# Patient Record
Sex: MALE | Race: WHITE | Employment: FULL TIME | URBAN - METROPOLITAN AREA
[De-identification: names, ages, dates, MRNs, and addresses within clinical notes are randomized per-mention and may not be internally consistent; named-entity substitution may affect disease eponyms.]

---

## 2021-05-03 ENCOUNTER — HOSPITAL ENCOUNTER (OUTPATIENT)
Dept: PREADMISSION TESTING | Age: 36
Discharge: HOME | End: 2021-05-03
Payer: COMMERCIAL

## 2021-05-03 LAB
ANION GAP SERPL CALC-SCNC: 4 MMOL/L (ref 3–18)
BUN SERPL-MCNC: 9 MG/DL (ref 7–18)
BUN/CREAT SERPL: 13 (ref 12–20)
CALCIUM SERPL-MCNC: 8.8 MG/DL (ref 8.5–10.1)
CHLORIDE SERPL-SCNC: 107 MMOL/L (ref 100–111)
CO2 SERPL-SCNC: 28 MMOL/L (ref 21–32)
CREAT SERPL-MCNC: 0.7 MG/DL (ref 0.6–1.3)
GLUCOSE SERPL-MCNC: 91 MG/DL (ref 74–99)
HCT VFR BLD AUTO: 48.9 % (ref 36–48)
HGB BLD-MCNC: 16.2 G/DL (ref 13–16)
POTASSIUM SERPL-SCNC: 4 MMOL/L (ref 3.5–5.5)
SODIUM SERPL-SCNC: 139 MMOL/L (ref 136–145)

## 2021-05-03 PROCEDURE — 80048 BASIC METABOLIC PNL TOTAL CA: CPT

## 2021-05-03 PROCEDURE — 93005 ELECTROCARDIOGRAM TRACING: CPT

## 2021-05-03 PROCEDURE — 36415 COLL VENOUS BLD VENIPUNCTURE: CPT

## 2021-05-03 PROCEDURE — 85018 HEMOGLOBIN: CPT

## 2021-05-04 LAB
ATRIAL RATE: 65 BPM
CALCULATED P AXIS, ECG09: 0 DEGREES
CALCULATED R AXIS, ECG10: 44 DEGREES
CALCULATED T AXIS, ECG11: 49 DEGREES
DIAGNOSIS, 93000: NORMAL
P-R INTERVAL, ECG05: 150 MS
Q-T INTERVAL, ECG07: 416 MS
QRS DURATION, ECG06: 102 MS
QTC CALCULATION (BEZET), ECG08: 432 MS
VENTRICULAR RATE, ECG03: 65 BPM

## 2021-05-06 ENCOUNTER — HOSPITAL ENCOUNTER (OUTPATIENT)
Dept: PREADMISSION TESTING | Age: 36
Discharge: HOME | End: 2021-05-06
Payer: COMMERCIAL

## 2021-05-06 PROCEDURE — U0003 INFECTIOUS AGENT DETECTION BY NUCLEIC ACID (DNA OR RNA); SEVERE ACUTE RESPIRATORY SYNDROME CORONAVIRUS 2 (SARS-COV-2) (CORONAVIRUS DISEASE [COVID-19]), AMPLIFIED PROBE TECHNIQUE, MAKING USE OF HIGH THROUGHPUT TECHNOLOGIES AS DESCRIBED BY CMS-2020-01-R: HCPCS

## 2021-05-07 LAB — SARS-COV-2, COV2NT: NOT DETECTED

## 2021-05-12 ENCOUNTER — ANESTHESIA (OUTPATIENT)
Dept: SURGERY | Age: 36
End: 2021-05-12
Payer: COMMERCIAL

## 2021-05-12 ENCOUNTER — ANESTHESIA EVENT (OUTPATIENT)
Dept: SURGERY | Age: 36
End: 2021-05-12
Payer: COMMERCIAL

## 2021-05-12 ENCOUNTER — HOSPITAL ENCOUNTER (OUTPATIENT)
Age: 36
Discharge: HOME | End: 2021-05-13
Attending: OTOLARYNGOLOGY | Admitting: OTOLARYNGOLOGY
Payer: COMMERCIAL

## 2021-05-12 DIAGNOSIS — G47.33 OSA (OBSTRUCTIVE SLEEP APNEA): Primary | ICD-10-CM

## 2021-05-12 PROCEDURE — 74011250636 HC RX REV CODE- 250/636: Performed by: OTOLARYNGOLOGY

## 2021-05-12 PROCEDURE — 76060000036 HC ANESTHESIA 2.5 TO 3 HR: Performed by: OTOLARYNGOLOGY

## 2021-05-12 PROCEDURE — C1713 ANCHOR/SCREW BN/BN,TIS/BN: HCPCS | Performed by: OTOLARYNGOLOGY

## 2021-05-12 PROCEDURE — 74011250636 HC RX REV CODE- 250/636: Performed by: SPECIALIST

## 2021-05-12 PROCEDURE — 77030031139 HC SUT VCRL2 J&J -A: Performed by: OTOLARYNGOLOGY

## 2021-05-12 PROCEDURE — 74011250637 HC RX REV CODE- 250/637: Performed by: OTOLARYNGOLOGY

## 2021-05-12 PROCEDURE — 76010000132 HC OR TIME 2.5 TO 3 HR: Performed by: OTOLARYNGOLOGY

## 2021-05-12 PROCEDURE — 77030008683 HC TU ET CUF COVD -A: Performed by: SPECIALIST

## 2021-05-12 PROCEDURE — 74011250636 HC RX REV CODE- 250/636: Performed by: NURSE ANESTHETIST, CERTIFIED REGISTERED

## 2021-05-12 PROCEDURE — 77030040361 HC SLV COMPR DVT MDII -B: Performed by: OTOLARYNGOLOGY

## 2021-05-12 PROCEDURE — 2709999900 HC NON-CHARGEABLE SUPPLY: Performed by: OTOLARYNGOLOGY

## 2021-05-12 PROCEDURE — 77030020782 HC GWN BAIR PAWS FLX 3M -B: Performed by: OTOLARYNGOLOGY

## 2021-05-12 PROCEDURE — 77030020905 HC ELECTRD COAG SUC COVD -A: Performed by: OTOLARYNGOLOGY

## 2021-05-12 PROCEDURE — 77030027138 HC INCENT SPIROMETER -A

## 2021-05-12 PROCEDURE — 74011000250 HC RX REV CODE- 250: Performed by: NURSE ANESTHETIST, CERTIFIED REGISTERED

## 2021-05-12 PROCEDURE — 74011000250 HC RX REV CODE- 250: Performed by: OTOLARYNGOLOGY

## 2021-05-12 PROCEDURE — 76210000016 HC OR PH I REC 1 TO 1.5 HR: Performed by: OTOLARYNGOLOGY

## 2021-05-12 PROCEDURE — 77030020269 HC MISC IMPL: Performed by: OTOLARYNGOLOGY

## 2021-05-12 PROCEDURE — 77030006643: Performed by: SPECIALIST

## 2021-05-12 RX ORDER — HYDROCODONE BITARTRATE AND ACETAMINOPHEN 7.5; 325 MG/15ML; MG/15ML
10 SOLUTION ORAL
Status: DISCONTINUED | OUTPATIENT
Start: 2021-05-12 | End: 2021-05-13 | Stop reason: HOSPADM

## 2021-05-12 RX ORDER — PROPOFOL 10 MG/ML
INJECTION, EMULSION INTRAVENOUS AS NEEDED
Status: DISCONTINUED | OUTPATIENT
Start: 2021-05-12 | End: 2021-05-12 | Stop reason: HOSPADM

## 2021-05-12 RX ORDER — GLYCOPYRROLATE 0.2 MG/ML
INJECTION INTRAMUSCULAR; INTRAVENOUS AS NEEDED
Status: DISCONTINUED | OUTPATIENT
Start: 2021-05-12 | End: 2021-05-12 | Stop reason: HOSPADM

## 2021-05-12 RX ORDER — MIDAZOLAM HYDROCHLORIDE 1 MG/ML
INJECTION, SOLUTION INTRAMUSCULAR; INTRAVENOUS AS NEEDED
Status: DISCONTINUED | OUTPATIENT
Start: 2021-05-12 | End: 2021-05-12 | Stop reason: HOSPADM

## 2021-05-12 RX ORDER — FENTANYL CITRATE 50 UG/ML
25 INJECTION, SOLUTION INTRAMUSCULAR; INTRAVENOUS
Status: DISCONTINUED | OUTPATIENT
Start: 2021-05-12 | End: 2021-05-12 | Stop reason: HOSPADM

## 2021-05-12 RX ORDER — SODIUM CHLORIDE, SODIUM LACTATE, POTASSIUM CHLORIDE, CALCIUM CHLORIDE 600; 310; 30; 20 MG/100ML; MG/100ML; MG/100ML; MG/100ML
125 INJECTION, SOLUTION INTRAVENOUS CONTINUOUS
Status: DISCONTINUED | OUTPATIENT
Start: 2021-05-12 | End: 2021-05-13 | Stop reason: HOSPADM

## 2021-05-12 RX ORDER — NEOSTIGMINE METHYLSULFATE 1 MG/ML
INJECTION, SOLUTION INTRAVENOUS AS NEEDED
Status: DISCONTINUED | OUTPATIENT
Start: 2021-05-12 | End: 2021-05-12 | Stop reason: HOSPADM

## 2021-05-12 RX ORDER — HYDROMORPHONE HYDROCHLORIDE 2 MG/ML
INJECTION, SOLUTION INTRAMUSCULAR; INTRAVENOUS; SUBCUTANEOUS AS NEEDED
Status: DISCONTINUED | OUTPATIENT
Start: 2021-05-12 | End: 2021-05-12 | Stop reason: HOSPADM

## 2021-05-12 RX ORDER — SODIUM CHLORIDE 0.9 % (FLUSH) 0.9 %
5-40 SYRINGE (ML) INJECTION AS NEEDED
Status: DISCONTINUED | OUTPATIENT
Start: 2021-05-12 | End: 2021-05-13 | Stop reason: HOSPADM

## 2021-05-12 RX ORDER — ONDANSETRON 2 MG/ML
4 INJECTION INTRAMUSCULAR; INTRAVENOUS ONCE
Status: DISCONTINUED | OUTPATIENT
Start: 2021-05-12 | End: 2021-05-12 | Stop reason: HOSPADM

## 2021-05-12 RX ORDER — HYDROMORPHONE HYDROCHLORIDE 1 MG/ML
1 INJECTION, SOLUTION INTRAMUSCULAR; INTRAVENOUS; SUBCUTANEOUS
Status: DISCONTINUED | OUTPATIENT
Start: 2021-05-12 | End: 2021-05-13 | Stop reason: HOSPADM

## 2021-05-12 RX ORDER — ACETAMINOPHEN 500 MG
1000 TABLET ORAL
Status: COMPLETED | OUTPATIENT
Start: 2021-05-12 | End: 2021-05-12

## 2021-05-12 RX ORDER — KETAMINE HYDROCHLORIDE 10 MG/ML
INJECTION, SOLUTION INTRAMUSCULAR; INTRAVENOUS AS NEEDED
Status: DISCONTINUED | OUTPATIENT
Start: 2021-05-12 | End: 2021-05-12 | Stop reason: HOSPADM

## 2021-05-12 RX ORDER — LABETALOL HYDROCHLORIDE 5 MG/ML
10 INJECTION, SOLUTION INTRAVENOUS ONCE
Status: DISCONTINUED | OUTPATIENT
Start: 2021-05-12 | End: 2021-05-12 | Stop reason: HOSPADM

## 2021-05-12 RX ORDER — DEXAMETHASONE SODIUM PHOSPHATE 4 MG/ML
INJECTION, SOLUTION INTRA-ARTICULAR; INTRALESIONAL; INTRAMUSCULAR; INTRAVENOUS; SOFT TISSUE AS NEEDED
Status: DISCONTINUED | OUTPATIENT
Start: 2021-05-12 | End: 2021-05-12 | Stop reason: HOSPADM

## 2021-05-12 RX ORDER — DIPHENHYDRAMINE HYDROCHLORIDE 50 MG/ML
12.5 INJECTION, SOLUTION INTRAMUSCULAR; INTRAVENOUS
Status: DISCONTINUED | OUTPATIENT
Start: 2021-05-12 | End: 2021-05-13 | Stop reason: HOSPADM

## 2021-05-12 RX ORDER — HYDROMORPHONE HYDROCHLORIDE 1 MG/ML
0.5 INJECTION, SOLUTION INTRAMUSCULAR; INTRAVENOUS; SUBCUTANEOUS
Status: DISCONTINUED | OUTPATIENT
Start: 2021-05-12 | End: 2021-05-12 | Stop reason: HOSPADM

## 2021-05-12 RX ORDER — VANCOMYCIN HYDROCHLORIDE 1 G/20ML
INJECTION, POWDER, LYOPHILIZED, FOR SOLUTION INTRAVENOUS AS NEEDED
Status: DISCONTINUED | OUTPATIENT
Start: 2021-05-12 | End: 2021-05-12 | Stop reason: HOSPADM

## 2021-05-12 RX ORDER — NALOXONE HYDROCHLORIDE 0.4 MG/ML
0.1 INJECTION, SOLUTION INTRAMUSCULAR; INTRAVENOUS; SUBCUTANEOUS
Status: DISCONTINUED | OUTPATIENT
Start: 2021-05-12 | End: 2021-05-12 | Stop reason: HOSPADM

## 2021-05-12 RX ORDER — NALOXONE HYDROCHLORIDE 0.4 MG/ML
0.4 INJECTION, SOLUTION INTRAMUSCULAR; INTRAVENOUS; SUBCUTANEOUS AS NEEDED
Status: DISCONTINUED | OUTPATIENT
Start: 2021-05-12 | End: 2021-05-13 | Stop reason: HOSPADM

## 2021-05-12 RX ORDER — LIDOCAINE HYDROCHLORIDE 20 MG/ML
INJECTION, SOLUTION EPIDURAL; INFILTRATION; INTRACAUDAL; PERINEURAL AS NEEDED
Status: DISCONTINUED | OUTPATIENT
Start: 2021-05-12 | End: 2021-05-12 | Stop reason: HOSPADM

## 2021-05-12 RX ORDER — OXYCODONE HYDROCHLORIDE 5 MG/1
5 TABLET ORAL ONCE
Status: ACTIVE | OUTPATIENT
Start: 2021-05-12 | End: 2021-05-13

## 2021-05-12 RX ORDER — ONDANSETRON 2 MG/ML
INJECTION INTRAMUSCULAR; INTRAVENOUS AS NEEDED
Status: DISCONTINUED | OUTPATIENT
Start: 2021-05-12 | End: 2021-05-12 | Stop reason: HOSPADM

## 2021-05-12 RX ORDER — SODIUM CHLORIDE 0.9 % (FLUSH) 0.9 %
5-40 SYRINGE (ML) INJECTION EVERY 8 HOURS
Status: DISCONTINUED | OUTPATIENT
Start: 2021-05-12 | End: 2021-05-13 | Stop reason: HOSPADM

## 2021-05-12 RX ORDER — ROCURONIUM BROMIDE 10 MG/ML
INJECTION, SOLUTION INTRAVENOUS AS NEEDED
Status: DISCONTINUED | OUTPATIENT
Start: 2021-05-12 | End: 2021-05-12 | Stop reason: HOSPADM

## 2021-05-12 RX ORDER — SODIUM CHLORIDE, SODIUM LACTATE, POTASSIUM CHLORIDE, CALCIUM CHLORIDE 600; 310; 30; 20 MG/100ML; MG/100ML; MG/100ML; MG/100ML
50 INJECTION, SOLUTION INTRAVENOUS CONTINUOUS
Status: DISCONTINUED | OUTPATIENT
Start: 2021-05-12 | End: 2021-05-12 | Stop reason: HOSPADM

## 2021-05-12 RX ORDER — PHENYLEPHRINE HCL IN 0.9% NACL 1 MG/10 ML
SYRINGE (ML) INTRAVENOUS AS NEEDED
Status: DISCONTINUED | OUTPATIENT
Start: 2021-05-12 | End: 2021-05-12 | Stop reason: HOSPADM

## 2021-05-12 RX ORDER — FENTANYL CITRATE 50 UG/ML
INJECTION, SOLUTION INTRAMUSCULAR; INTRAVENOUS AS NEEDED
Status: DISCONTINUED | OUTPATIENT
Start: 2021-05-12 | End: 2021-05-12 | Stop reason: HOSPADM

## 2021-05-12 RX ADMIN — GLYCOPYRROLATE 0.2 MG: 0.2 INJECTION INTRAMUSCULAR; INTRAVENOUS at 15:57

## 2021-05-12 RX ADMIN — HYDROCODONE BITARTRATE AND ACETAMINOPHEN 10 MG: 7.5; 325 SOLUTION ORAL at 22:53

## 2021-05-12 RX ADMIN — FENTANYL CITRATE 25 MCG: 50 INJECTION, SOLUTION INTRAMUSCULAR; INTRAVENOUS at 20:41

## 2021-05-12 RX ADMIN — Medication 100 MCG: at 18:03

## 2021-05-12 RX ADMIN — Medication 100 MCG: at 16:49

## 2021-05-12 RX ADMIN — SODIUM CHLORIDE, SODIUM LACTATE, POTASSIUM CHLORIDE, AND CALCIUM CHLORIDE: 600; 310; 30; 20 INJECTION, SOLUTION INTRAVENOUS at 16:44

## 2021-05-12 RX ADMIN — CEFAZOLIN 1 G: 1 INJECTION, POWDER, FOR SOLUTION INTRAMUSCULAR; INTRAVENOUS at 22:53

## 2021-05-12 RX ADMIN — ONDANSETRON HYDROCHLORIDE 4 MG: 2 INJECTION INTRAMUSCULAR; INTRAVENOUS at 16:35

## 2021-05-12 RX ADMIN — Medication 100 MCG: at 17:01

## 2021-05-12 RX ADMIN — KETAMINE HYDROCHLORIDE 20 MG: 10 INJECTION, SOLUTION INTRAMUSCULAR; INTRAVENOUS at 16:24

## 2021-05-12 RX ADMIN — Medication 1.5 MG: at 18:18

## 2021-05-12 RX ADMIN — SODIUM CHLORIDE, SODIUM LACTATE, POTASSIUM CHLORIDE, AND CALCIUM CHLORIDE 125 ML/HR: 600; 310; 30; 20 INJECTION, SOLUTION INTRAVENOUS at 22:18

## 2021-05-12 RX ADMIN — FENTANYL CITRATE 25 MCG: 50 INJECTION, SOLUTION INTRAMUSCULAR; INTRAVENOUS at 21:14

## 2021-05-12 RX ADMIN — HYDROMORPHONE HYDROCHLORIDE 1 MG: 2 INJECTION, SOLUTION INTRAMUSCULAR; INTRAVENOUS; SUBCUTANEOUS at 16:44

## 2021-05-12 RX ADMIN — Medication 100 MCG: at 17:10

## 2021-05-12 RX ADMIN — FENTANYL CITRATE 25 MCG: 50 INJECTION, SOLUTION INTRAMUSCULAR; INTRAVENOUS at 21:26

## 2021-05-12 RX ADMIN — MIDAZOLAM 2 MG: 1 INJECTION INTRAMUSCULAR; INTRAVENOUS at 15:57

## 2021-05-12 RX ADMIN — HYDROMORPHONE HYDROCHLORIDE 0.5 MG: 1 INJECTION, SOLUTION INTRAMUSCULAR; INTRAVENOUS; SUBCUTANEOUS at 18:58

## 2021-05-12 RX ADMIN — LIDOCAINE HYDROCHLORIDE 100 MG: 20 INJECTION, SOLUTION EPIDURAL; INFILTRATION; INTRACAUDAL; PERINEURAL at 16:06

## 2021-05-12 RX ADMIN — PROPOFOL 300 MG: 10 INJECTION, EMULSION INTRAVENOUS at 16:06

## 2021-05-12 RX ADMIN — HYDROMORPHONE HYDROCHLORIDE 0.5 MG: 1 INJECTION, SOLUTION INTRAMUSCULAR; INTRAVENOUS; SUBCUTANEOUS at 19:16

## 2021-05-12 RX ADMIN — DEXAMETHASONE SODIUM PHOSPHATE 10 MG: 4 INJECTION, SOLUTION INTRAMUSCULAR; INTRAVENOUS at 16:27

## 2021-05-12 RX ADMIN — CEFAZOLIN 3 G: 1 INJECTION, POWDER, FOR SOLUTION INTRAVENOUS at 16:14

## 2021-05-12 RX ADMIN — GLYCOPYRROLATE 0.2 MG: 0.2 INJECTION INTRAMUSCULAR; INTRAVENOUS at 18:18

## 2021-05-12 RX ADMIN — ACETAMINOPHEN 1000 MG: 500 TABLET ORAL at 13:44

## 2021-05-12 RX ADMIN — KETAMINE HYDROCHLORIDE 10 MG: 10 INJECTION, SOLUTION INTRAMUSCULAR; INTRAVENOUS at 16:04

## 2021-05-12 RX ADMIN — FENTANYL CITRATE 100 MCG: 50 INJECTION, SOLUTION INTRAMUSCULAR; INTRAVENOUS at 15:57

## 2021-05-12 RX ADMIN — SODIUM CHLORIDE, SODIUM LACTATE, POTASSIUM CHLORIDE, AND CALCIUM CHLORIDE 125 ML/HR: 600; 310; 30; 20 INJECTION, SOLUTION INTRAVENOUS at 13:40

## 2021-05-12 RX ADMIN — KETAMINE HYDROCHLORIDE 20 MG: 10 INJECTION, SOLUTION INTRAMUSCULAR; INTRAVENOUS at 16:01

## 2021-05-12 RX ADMIN — ROCURONIUM BROMIDE 50 MG: 10 INJECTION, SOLUTION INTRAVENOUS at 16:06

## 2021-05-12 NOTE — PERIOP NOTES
Reviewed PTA medication list with patient/caregiver and patient/caregiver denies any additional medications. Patient admits to having a responsible adult care for them at home for at least 24 hours after surgery. Patient encouraged to use gown warming system and informed that using said warming gown to regulate body temperature prior to a procedure has been shown to help reduce the risks of blood clots and infection. Patient's pharmacy of choice verified and documented in PTA medication section. Dual skin assessment & fall risk band verification completed with Andreea Oates RN.

## 2021-05-12 NOTE — PROGRESS NOTES
Operative Report    Indications: This is a 43 yrs male who presents with BANDAR . He was positive for tonsillar hypertrophy . The patient was admitted for surgery as conservative measures have failed. Date of Procedure: 5/12/2021    Procedure Name: Procedure(s):  UVULOPALATOPHARYNGOPLASTY (UPPP),  TONSILLECTOMY,HYOID SUSPENSION    Preoperative Diagnosis: UVULAR HYPERTROPHY,TONSILLOR HYPERTROPHY,OBSTRUCTIVE SLEEP APNEA    Postoperative Diagnosis:  UVULAR HYPERTROPHY,TONSILLOR HYPERTROPHY,OBSTRUCTIVE SLEEP APNEA    Surgeon(s):  Luli Barreto DO    Anesthesia:  General    Findings:  + 3 tonsils    Estimated Blood Loss:  50ml    Specimens: none    Asst; none    Implants; siesta hyoid suspension system                Complications:  None; patient tolerated the procedure well.     Signed By: Musa Bocanegra DO     May 12, 2021

## 2021-05-12 NOTE — INTERVAL H&P NOTE
Update History & Physical    The Patient's History and Physical of May 3,   2021 was reviewed with the patient and I examined the patient. There was no change. The surgical site was confirmed by the patient and me. Plan:  The risk, benefits, expected outcome, and alternative to the recommended procedure have been discussed with the patient. Patient understands and wants to proceed with the procedure.     Electronically signed by Lisa Roach DO on 5/12/2021 at 3:34 PM

## 2021-05-12 NOTE — ANESTHESIA PREPROCEDURE EVALUATION
Relevant Problems   No relevant active problems       Anesthetic History   No history of anesthetic complications            Review of Systems / Medical History  Patient summary reviewed, nursing notes reviewed and pertinent labs reviewed    Pulmonary        Sleep apnea           Neuro/Psych   Within defined limits           Cardiovascular    Hypertension              Exercise tolerance: >4 METS     GI/Hepatic/Renal  Within defined limits              Endo/Other        Obesity     Other Findings              Physical Exam    Airway  Mallampati: III  TM Distance: 4 - 6 cm  Neck ROM: normal range of motion   Mouth opening: Normal     Cardiovascular               Dental    Dentition: Caps/crowns     Pulmonary                 Abdominal         Other Findings            Anesthetic Plan    ASA: 3  Anesthesia type: general          Induction: Intravenous  Anesthetic plan and risks discussed with: Patient

## 2021-05-13 VITALS
TEMPERATURE: 97.7 F | SYSTOLIC BLOOD PRESSURE: 130 MMHG | DIASTOLIC BLOOD PRESSURE: 81 MMHG | RESPIRATION RATE: 17 BRPM | OXYGEN SATURATION: 98 % | HEIGHT: 73 IN | WEIGHT: 297.06 LBS | HEART RATE: 71 BPM | BODY MASS INDEX: 39.37 KG/M2

## 2021-05-13 PROCEDURE — 74011250637 HC RX REV CODE- 250/637: Performed by: OTOLARYNGOLOGY

## 2021-05-13 PROCEDURE — 74011250636 HC RX REV CODE- 250/636: Performed by: OTOLARYNGOLOGY

## 2021-05-13 PROCEDURE — 74011000250 HC RX REV CODE- 250: Performed by: OTOLARYNGOLOGY

## 2021-05-13 RX ADMIN — HYDROCODONE BITARTRATE AND ACETAMINOPHEN 10 MG: 7.5; 325 SOLUTION ORAL at 09:02

## 2021-05-13 RX ADMIN — HYDROCODONE BITARTRATE AND ACETAMINOPHEN 10 MG: 7.5; 325 SOLUTION ORAL at 02:53

## 2021-05-13 RX ADMIN — HYDROMORPHONE HYDROCHLORIDE 1 MG: 1 INJECTION, SOLUTION INTRAMUSCULAR; INTRAVENOUS; SUBCUTANEOUS at 04:20

## 2021-05-13 RX ADMIN — HYDROMORPHONE HYDROCHLORIDE 1 MG: 1 INJECTION, SOLUTION INTRAMUSCULAR; INTRAVENOUS; SUBCUTANEOUS at 00:29

## 2021-05-13 RX ADMIN — CEFAZOLIN 1 G: 1 INJECTION, POWDER, FOR SOLUTION INTRAMUSCULAR; INTRAVENOUS at 04:19

## 2021-05-13 NOTE — PERIOP NOTES
TRANSFER - OUT REPORT:    Verbal report given to Kraen Rosas RN (name) on Tyler Lepe  being transferred to 2 S (unit) for routine progression of care       Report consisted of patients Situation, Background, Assessment and   Recommendations(SBAR). Information from the following report(s) OR Summary, Procedure Summary, Intake/Output and MAR was reviewed with the receiving nurse. Lines:   Peripheral IV 05/12/21 Left;Posterior Hand (Active)   Site Assessment Clean, dry, & intact 05/12/21 1944   Phlebitis Assessment 0 05/12/21 1944   Infiltration Assessment 0 05/12/21 1944   Dressing Status Clean, dry, & intact 05/12/21 1944   Dressing Type Transparent 05/12/21 1944   Hub Color/Line Status Infusing 05/12/21 1944   Alcohol Cap Used No 05/12/21 1342        Opportunity for questions and clarification was provided.       Patient transported with:   O2 @ 2 liters  Registered Nurse

## 2021-05-13 NOTE — PROGRESS NOTES
0902 - Pain 6/10. PRN 10 mg hycet oral solution pain medication administered at this time. Patient has been educated on side effects. 3946 - Patient in bed at this time. A/O x 4. IV to left hand  intact and patent. Plexis to BLE. White wrap dressing to face/throat CDI. Patient denies numbness/tingling. Pedal pulses palpable. Lungs clear. Bowel sounds active to all quadrants. Patient able to get to 1400 on the incentive spirometer. Pain 6/10.     1008-Discharge instructions reviewed with patient at this time. Opportunity for questions and clarification was provided. Patient has verbalized understanding. Patient was provided with care notes to include side effects of RX's. Arm bands removed and shredded. AVS reviewed with [facility] Rn.      IV removed. Dressing CDI. 1020-Called pharmacy to confirm we can fill patient's prescription for liquid hydrocodone as the patient's [facility] can only fill it next Wednesday. Called Dr. Chris Jones and gave him [facility] pharmacy information so he can e prescribe the medication.

## 2021-05-13 NOTE — ROUTINE PROCESS
TRANSFER - IN REPORT:    Verbal report received from CARMEL Camejo(name) on Yonis Motta  being received from PACU for routine post - op. Report consisted of patients Situation, Background, Assessment and   Recommendations(SBAR). Information from the following report(s) SBAR, Kardex, OR Summary, Intake/Output and MAR was reviewed with the receiving nurse. Opportunity for questions and clarification was provided. Assessment to be completed upon patients arrival to unit and care assumed.

## 2021-05-13 NOTE — ROUTINE PROCESS
Bedside and Verbal shift change report given to YANICK Salazar RN by Kai Hernandez RN. Report included the following information SBAR, Kardex, Intake/Output and MAR.

## 2021-05-13 NOTE — PERIOP NOTES
SBAR given to [employer] denies difficulty breathing.  No swelling, bleeding, or redness at the site and AOX4

## 2021-05-13 NOTE — PROGRESS NOTES
Problem: Falls - Risk of  Goal: *Absence of Falls  Description: Document Lear Shaker Fall Risk and appropriate interventions in the flowsheet.   Outcome: Progressing Towards Goal  Note: Fall Risk Interventions:            Medication Interventions: Teach patient to arise slowly

## 2021-05-13 NOTE — PROGRESS NOTES
2205 - Patient arrives to unit at this time. Admission completed at this time. Patient is A/O x 4, 2L NC. Pt placed on capnography. Denies chest pain and SOB. Plexi compression device bilaterally. Gauze/wrap dressing to chin/head C/D/I. Denies numbness/tingling/calf pain. Pain 8/10 with a tolerable level of 5/10 pain medication administered per MAR. Pt educated on IS use, q2h rounds, pain management, CHG wipes and \"Up for Meals\". Pt verbalized understanding, no concerns voiced. Call bell within reach, bed in lowest position. Pt encouraged to call for assistance. FALL BAND IN PLACE.    0029 - Patient rated pain 8/10, pain medication administered per MAR. No other concerns voiced at this time. Call bell within reach, bed in lowest position. Pt encouraged to use call bell for any needs. 7980 - Patient rated pain 8/10, pain medication administered per MAR. No other concerns voiced at this time. Call bell within reach, bed in lowest position. Pt encouraged to use call bell for any needs. 6093 - Patient rated pain 7/10, pain medication administered per MAR. No other concerns voiced at this time. Call bell within reach, bed in lowest position. Pt encouraged to use call bell for any needs.

## 2021-05-13 NOTE — PROGRESS NOTES
05/12/21 2225   Patient Observations   Pulse (Heart Rate) 77   Resp Rate 14   O2 Sat (%) 98 %   ETCO2 (mmHg) 50 mmHg

## 2021-05-13 NOTE — ANESTHESIA POSTPROCEDURE EVALUATION
Post-Anesthesia Evaluation and Assessment    Cardiovascular Function/Vital Signs  Visit Vitals  /85   Pulse 72   Temp 36.2 °C (97.2 °F)   Resp 13   Ht 6' 1\" (1.854 m)   Wt 134.7 kg (297 lb 1 oz)   SpO2 97%   BMI 39.19 kg/m²       Patient is status post Procedure(s):  UVULOPALATOPHARYNGOPLASTY (UPPP),  TONSILLECTOMY,HYOID SUSPENSION. Nausea/Vomiting: Controlled. Postoperative hydration reviewed and adequate. Pain:  Pain Scale 1: FLACC (05/12/21 1941)  Pain Intensity 1: 0 (05/12/21 1941)   Managed. Neurological Status:   Neuro (WDL): Within Defined Limits (05/12/21 1941)   At baseline. Mental Status and Level of Consciousness: Baseline and appropriate for discharge. Pulmonary Status:   O2 Device: Nasal cannula (05/12/21 1950)   Adequate oxygenation and airway patent. Complications related to anesthesia: None    Post-anesthesia assessment completed. No concerns. Patient has met all discharge requirements.     Signed By: Everette Garrison MD    May 12, 2021

## 2021-05-13 NOTE — PERIOP NOTES
Patient received by Alfredo Caballero, patient stable, Blood pressure (!) 146/92, pulse 69, temperature 98.8 °F (37.1 °C), resp. rate 16, height 6' 1\" (1.854 m), weight 134.7 kg (297 lb 1 oz), SpO2 98 %.

## 2021-05-13 NOTE — PROGRESS NOTES
[facility] ENT ~ Allergy Daily Progress Note     Assessment:     1. POD1 Multilevel airway surgery, UP3, Hyoid   2. BANDAR    Plan:     1. Doing well, d/c home today 5/13  2. May shower  3. Maintain dressing x 3 days   4. Diet as tolerated   5. Pain meds as rx'ed   6. Antibiotics as rx'ed   7. F/u 2 week     Subjective:     Patient's condition is good. Resting comfortably in bed . Eating . No airway issues     Objective:     Visit Vitals  /81   Pulse 71   Temp 97.7 °F (36.5 °C)   Resp 17   Ht 6' 1\" (1.854 m)   Wt 134.7 kg (297 lb 1 oz)   SpO2 98%   BMI 39.19 kg/m²       Intake and Output:  Current Shift:  No intake/output data recorded. Last three shifts:  05/11 1901 - 05/13 0700  In: 1000 [I.V.:1000]  Out: 2500 [Urine:2500]    Lab/Data Reviewed:  No results found for this or any previous visit (from the past 24 hour(s)).     Current Facility-Administered Medications   Medication Dose Route Frequency Provider Last Rate Last Admin    lactated Ringers infusion  125 mL/hr IntraVENous CONTINUOUS Ranelle Lathe,  mL/hr at 05/12/21 2218 125 mL/hr at 05/12/21 2218    sodium chloride (NS) flush 5-40 mL  5-40 mL IntraVENous Q8H Ranelle Lathe, DO   Stopped at 05/12/21 2300    sodium chloride (NS) flush 5-40 mL  5-40 mL IntraVENous PRN Ranelle Lathe, DO        acetaminophen (TYLENOL) solution 650 mg  650 mg Oral Q4H PRN Ranelle Lathe, DO        HYDROcodone-acetaminophen (HYCET) 0.5-21.7 mg/mL oral solution 10 mg  10 mg Oral Q4H PRN Ranelle Lathe, DO   10 mg at 05/13/21 0253    HYDROmorphone (PF) (DILAUDID) injection 1 mg  1 mg IntraVENous Q4H PRN Ranelle Lathe, DO   1 mg at 05/13/21 0420    naloxone [facility]) injection 0.4 mg  0.4 mg IntraVENous PRN Ranelle Lathe, DO        promethazine [facility]) with saline injection 12.5 mg  12.5 mg IntraVENous Q6H PRN Ranelle Lathe, DO        diphenhydrAMINE (BENADRYL) injection 12.5 mg  12.5 mg IntraVENous Q4H PRN Ranelle Lathe, DO           General: vss, afeb  HEENT: wounds c/d/i     FWL

## 2021-05-13 NOTE — PROGRESS NOTES
Transition of Care (JAYDA) Plan:          Pt admitted for an elective surgical procedure. Pt is independent. Please encourage ambulation. No transition of care needs identified at this time. Anticipate pt will be medically stable for discharge within the next 24-48 hours with physician follow up. CM available to assist as needed. JAYDA Transportation:   How is patient being transported at discharge? Family/Friend      When? Once cleared by physician     Is transport scheduled? N/A      Follow-up appointment and transportation:   PCP/Specialist?  See AVS for Appointment         Who is transporting to the follow-up appointment? Self/Family/Friend      Is transport for follow up appointment scheduled? N/A    Communication plan (with patient/family): Who is being called? Patient or Next of Kin? Responsible party? Patient      What number(s) is to be used? See Facesheet      What service provider is calling for [facility] services? When are they calling? Readmission Risk? (Green/Low; Yellow/Moderate; Red/High):  Miguel here to complete [address] including selection of the Healthcare Decision Maker Relationship (ie \"Primary\")  Care Management Interventions  PCP Verified by CM: Yes  Palliative Care Criteria Met (RRAT>21 & CHF Dx)?: No  Mode of Transport at Discharge: Other (see comment)(girlfriend)  Current Support Network:  Other  Confirm Follow Up Transport: Family

## 2021-05-14 NOTE — OP NOTES
[facility]  OPERATIVE REPORT    Name:  Josefina Bueno  MR#:   [id number]  :  1985  ACCOUNT #:  [de-identified]  DATE OF SERVICE:  2021    PREOPERATIVE DIAGNOSES:  1.  Obstructive sleep apnea. 2.  Tonsillar hypertrophy. POSTOPERATIVE DIAGNOSES:  1.  Obstructive sleep apnea. 2.  Tonsillar hypertrophy. PROCEDURE PERFORMED:  1. Hyoid suspension. 2.  Tonsillectomy. 3.  Uvulopalatopharyngoplasty. SURGEON:  Blessing Fry DO    ASSISTANT:  No surgical assistant. ANESTHESIA:  General endotracheal.    COMPLICATIONS:  None. SPECIMENS REMOVED:  None. IMPLANTS:  Siesta hyoid suspension kit. DRAINS:  None. ESTIMATED BLOOD LOSS:  Less than 50 mL. INDICATIONS:  This is a 41-year-old male who has failed medical therapy for obstructive sleep apnea. He is brought to the operating room for multilevel airway surgery. The risks, benefits, and alternatives of operative intervention were discussed with the patient preoperatively. He stated his understanding and desired to proceed. PROCEDURE:  The patient was brought into the operating room and placed supine on the operating table. After induction of general anesthetic, he was rotated from Anesthesia 90 degrees, and a surgical pause was performed. He was then suspended from the McIvor mouth gag, and a standard Bovie tonsillectomy was completed. Hemostasis was obtained with Bovie cautery. The palate was then trimmed of the uvula, and the anterior and posterior pillars were divided using a Bovie cautery at the midline, and then the anterior and posteriors pillars were reapproximated in the standard fashion with 2-0 Vicryl suture. The oral cavity and oropharynx were copiously irrigated and inspected for hemostasis which was excellent. The patient was taken out of suspension, and attention was directed to the neck.     A stair-step incision was drawn in the submental region, and then the patient was prepped and draped in a standard sterile fashion. A #15 blade was used to make a 2-cm submental incision, followed by dissection down to the submental musculature and then to the lingual aspect of the mandible on the left and right. This was done with both sharp and dull dissection with Bovie cautery for hemostasis. At this point, a 11/16 mm drill bit was brought into field and a drill bit to the left lingual cortex was performed, followed by the placement of a Siesta bone screw. Attention was directed to the right-hand side, where in similar fashion, the tapping screw was made through the lingual cortex and then a tapping drill was made and then a screw was placed. This area was then irrigated with vancomycin-impregnated saline, and then attention was directed to the lower stair-step incision that was approximately 4 cm that was opened to the skin. Subcutaneous tissues were divided using blunt and sharp dissection with Bovie, carried down to the hyoid which was significantly retroflexed behind the laryngeal cartilage by approximately a centimeter to a centimeter and half. The anterior hyoid was cleaned of its attachments and then the area was irrigated. At this point, the Revolution suture passer was passed. A Seldinger technique for the suspension sutures were passed and tied off. I then passed a tonsil dissector from the superior to the inferior portion of the wound, brought the sutures forward, and then the right suspension suture was placed in the left suspension screw to the mandible. Just prior to that, I had locked the guide suture from the left mandibular screw and I replaced the screw with the extra screw and then guided the white suture through the left mandibular suspension. Then, on the right-hand side, the green suspension suture was passed. The patient's shoulder roll was taken out. The patient was brought into some flexion.     The hyoid was grasped and brought forward with an Allis clamp, and then the suspension sutures were secured in a standard fashion with a screwdriver and then a double overlap knot was performed and the suture was trimmed. The area was then copiously irrigated. The area was re-inspected. There was good elevation and anterior protrusion to this hyoid, and then after irrigation one more time, the wound was closed in multiple layers of 3-0 and 4-0 Monocryl suture. A compression dressing was applied, and the patient was then given back to Anesthesia, awoken in the operating room without difficulty and transferred to PACU with stable vital signs.       Radha Cedillo DO      [id number]  D:  05/14/2021 9:04  T:  05/14/2021 12:34  JOB #:  [id number]

## (undated) DEVICE — SUTURE VCRL SZ 2-0 L27IN ABSRB VLT L26MM UR-6 5/8 CIR J602H

## (undated) DEVICE — ENT PACK: Brand: MEDLINE INDUSTRIES, INC.

## (undated) DEVICE — TONSIL SPONGES: Brand: DEROYAL

## (undated) DEVICE — GARMENT,MEDLINE,DVT,INT,CALF,MED, GEN2: Brand: MEDLINE

## (undated) DEVICE — KIT,ANTI FOG,W/SPONGE & FLUID,SOFT PACK: Brand: MEDLINE

## (undated) DEVICE — SOL IRRIGATION INJ NACL 0.9% 500ML BTL

## (undated) DEVICE — SYRINGE IRRIG 60ML SFT PLIABLE BLB EZ TO GRP 1 HND USE W/

## (undated) DEVICE — SUCTION COAGULATOR: Brand: VALLEYLAB

## (undated) DEVICE — STERILE POLYISOPRENE POWDER-FREE SURGICAL GLOVES: Brand: PROTEXIS

## (undated) DEVICE — YANKAUER,FLEXIBLE HANDLE,REGLR CAPACITY: Brand: MEDLINE INDUSTRIES, INC.